# Patient Record
Sex: MALE | Race: WHITE | URBAN - METROPOLITAN AREA
[De-identification: names, ages, dates, MRNs, and addresses within clinical notes are randomized per-mention and may not be internally consistent; named-entity substitution may affect disease eponyms.]

---
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
## 2022-03-08 ENCOUNTER — HOSPITAL ENCOUNTER (EMERGENCY)
Age: 22
Discharge: HOME | End: 2022-03-08
Attending: EMERGENCY MEDICINE

## 2022-03-08 VITALS
DIASTOLIC BLOOD PRESSURE: 82 MMHG | WEIGHT: 140 LBS | TEMPERATURE: 97.3 F | BODY MASS INDEX: 20.67 KG/M2 | SYSTOLIC BLOOD PRESSURE: 146 MMHG | HEART RATE: 63 BPM | RESPIRATION RATE: 16 BRPM | OXYGEN SATURATION: 98 %

## 2022-03-08 DIAGNOSIS — F41.1 ANXIETY STATE: Primary | ICD-10-CM

## 2022-03-08 DIAGNOSIS — Z86.59 HISTORY OF DEPRESSION: ICD-10-CM

## 2022-03-08 LAB
EKG ATRIAL RATE: 58 BPM
EKG P AXIS: 39 DEGREES
EKG P-R INTERVAL: 164 MS
EKG Q-T INTERVAL: 414 MS
EKG QRS DURATION: 108 MS
EKG QTC CALCULATION (BAZETT): 406 MS
EKG R AXIS: 94 DEGREES
EKG T AXIS: 65 DEGREES
EKG VENTRICULAR RATE: 58 BPM

## 2022-03-08 PROCEDURE — 93005 ELECTROCARDIOGRAM TRACING: CPT | Performed by: STUDENT IN AN ORGANIZED HEALTH CARE EDUCATION/TRAINING PROGRAM

## 2022-03-08 PROCEDURE — 99284 EMERGENCY DEPT VISIT MOD MDM: CPT

## 2022-03-08 ASSESSMENT — ENCOUNTER SYMPTOMS
COUGH: 0
ABDOMINAL PAIN: 0
DIARRHEA: 0
NAUSEA: 0
BACK PAIN: 0
RHINORRHEA: 0
SHORTNESS OF BREATH: 0
CONSTIPATION: 0
VOMITING: 0

## 2022-03-08 NOTE — ED PROVIDER NOTES
[facility]   Emergency Department  Faculty Attestation       I performed a history and physical examination of the patient and discussed management with the resident. I reviewed the residents note and agree with the documented findings including all diagnostic interpretations and plan of care. Any areas of disagreement are noted on the chart. I was personally present for the key portions of any procedures. I have documented in the chart those procedures where I was not present during the key portions. I have reviewed the emergency nurses triage note. I agree with the chief complaint, past medical history, past surgical history, allergies, medications, social and family history as documented unless otherwise noted below. Documentation of the HPI, Physical Exam and Medical Decision Making performed by scribkaran is based on my personal performance of the HPI, PE and MDM. For Physician Assistant/ Nurse Practitioner cases/documentation I have personally evaluated this patient and have completed at least one if not all key elements of the E/M (history, physical exam, and MDM). Additional findings are as noted. Pertinent Comments     Primary Care Physician: Amita Garcia PA-C      ED Triage Vitals [03/08/22 0229]   BP Temp Temp Source Pulse Resp SpO2 Height Weight   (!) 146/82 97.3 °F (36.3 °C) Oral 63 16 98 % -- 140 lb (63.5 kg)        History/Physical: This is a 24 y.o. male who presents to the Emergency Department with complaint of possible anxiety attack at work. 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 He went to the bathroom and felt his breathing increased rapidly and his heartbeat rapidly as well. He then had 1 episode of vomiting. He has been having a high amount of personal stress and also been dealing with depression. He was started on Lexapro approximately 5 days ago.   Initially when starting, he was having very labile emotions with first 2 to 3 days, and then had been feeling improved, denies any thoughts of suicide any point time. Does not have a history of any prior panic attacks. But does state he is suffering from a lot of stress. He does feel improved now back to baseline. He is accompanied by his uncle. There is no significant family history of cardiac disease or sudden unexplained death at a young age. Patient does not have any cardiac risk factors. No PE risk factors. On exam, patient is a thin appearing male resting comfortably in bed in no acute distress. Normocephalic atraumatic with moist mucous membranes. Heart sounds are regular with no murmurs or gallops and lungs good auscultation bilaterally. Abdomen soft nontender nondistended. Alert and oriented x4 with no focal deficits and a normal gait. No leg swelling. Normal affect with no suicidal homicidal ideations    MDM/Plan:   Patient with episode at work where he felt flushed, tachycardia short of breath and had episode of emesis. Was recently started on Lexapro and has been doing with high stress situations with depression. Patient has been having some mood swings after starting on Lexapro. Discussed with him that this can happen for some time, and these medications can take up to 3 to 4 weeks to really take effect and stabilize out. Discussed the importance of returning or calling for resources if any signs of suicidal ideations come into play. Denies any cardiac risk factors. Did obtain an EKG with no significant concerns. No family risk factors. Had lengthy discussion with him and his uncle that this could be several different things including an anxiety attack.   It could be the start of the stomach virus has been very prevalent in our region, but did discuss that if this occurs again that it would be important for him to follow-up with his primary care or to return to the emergency department at that time we would consider doing further lab work-up. Patient is comfortable with this plan and therefore using shared decision-making, will go ahead and discharge home. EKG Interpretation    Interpreted by emergency department physician    Clinical Impression: Sinus bradycardia with incomplete right bundle branch block.   No signs of arrhythmia no signs of acute ischemia    Almaz Ocasio MD        Critical Care: None     Almaz Ocasio MD  Attending Emergency Physician         Almaz Ocasio MD  03/08/22 [id number]

## 2022-03-08 NOTE — Clinical Note
Olvin Leiva was seen and treated in our emergency department on 3/8/2022. He may return to work on 03/09/2022. If you have any questions or concerns, please don't hesitate to call.       Kong Sullivan, DO

## 2022-03-08 NOTE — ED NOTES
Pt to ED via private auto with c/o new onset anxiety that started while he was at work at [address] [place] [address] Rd. Pt states he has a new dx of depression and has a lot going on at home and states he was feeling a bit overwhelmed resulting in this \"anxiety attack\".  Pt in NAD and A/O x [address] [facility], RN  03/08/22 [id number]